# Patient Record
Sex: MALE | URBAN - METROPOLITAN AREA
[De-identification: names, ages, dates, MRNs, and addresses within clinical notes are randomized per-mention and may not be internally consistent; named-entity substitution may affect disease eponyms.]

---

## 2018-12-13 ENCOUNTER — OFFICE VISIT (OUTPATIENT)
Dept: URBAN - METROPOLITAN AREA CLINIC 11 | Facility: CLINIC | Age: 63
End: 2018-12-13
Payer: COMMERCIAL

## 2018-12-13 DIAGNOSIS — H11.152 PINGUECULA, LEFT EYE: ICD-10-CM

## 2018-12-13 DIAGNOSIS — H11.011 AMYLOID PTERYGIUM OF RIGHT EYE: Primary | ICD-10-CM

## 2018-12-13 PROCEDURE — 99204 OFFICE O/P NEW MOD 45 MIN: CPT | Performed by: OPTOMETRIST

## 2018-12-13 ASSESSMENT — INTRAOCULAR PRESSURE
OS: 15
OD: 15

## 2018-12-13 NOTE — IMPRESSION/PLAN
Impression: Amyloid pterygium of right eye: H11.011. Plan: Recommend sx. Refer to Dr Francine Arita for sx eval. Recommend art tears 1gt 2-3x/day ou.

## 2019-01-03 ENCOUNTER — OFFICE VISIT (OUTPATIENT)
Dept: URBAN - METROPOLITAN AREA CLINIC 11 | Facility: CLINIC | Age: 64
End: 2019-01-03
Payer: COMMERCIAL

## 2019-01-03 DIAGNOSIS — H11.051 PERIPHERAL PTERYGIUM, PROGRESSIVE, RIGHT EYE: Primary | Chronic | ICD-10-CM

## 2019-01-03 PROCEDURE — 92012 INTRM OPH EXAM EST PATIENT: CPT | Performed by: OPHTHALMOLOGY

## 2019-01-03 NOTE — IMPRESSION/PLAN
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 OS. Condition: established, stable. Plan: Discussed diagnosis in detail with patient. Discussed treatment options with patient. No treatment is required at this time. Will continue to observe condition and or symptoms.

## 2019-01-03 NOTE — IMPRESSION/PLAN
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 OD. Condition: established, stable. Symptoms: may improve with surgery. Vision: vision threatening. Plan: Discussed diagnosis with patient. Discussed treatment options. Discussed risks, benefits, alternatives to surgery. Patient elects surgical treatment. Recommend RIGHT Pterygium Excision with Graft and MMC.  RL2.

## 2019-05-23 ENCOUNTER — OFFICE VISIT (OUTPATIENT)
Dept: URBAN - METROPOLITAN AREA CLINIC 11 | Facility: CLINIC | Age: 64
End: 2019-05-23
Payer: COMMERCIAL

## 2019-05-23 PROCEDURE — 99213 OFFICE O/P EST LOW 20 MIN: CPT | Performed by: OPTOMETRIST

## 2019-05-23 ASSESSMENT — INTRAOCULAR PRESSURE
OD: 18
OS: 18

## 2019-05-23 NOTE — IMPRESSION/PLAN
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 OD. Condition: established, stable. Symptoms: may improve with surgery. Vision: vision threatening. Plan: recommend sx with Dr Kyle Harrell. Pt wants to wait because insurance only covers 25%.  Recommend art tears tid ou. f/u 4mns

## 2019-06-20 ENCOUNTER — OFFICE VISIT (OUTPATIENT)
Dept: URBAN - METROPOLITAN AREA CLINIC 11 | Facility: CLINIC | Age: 64
End: 2019-06-20
Payer: COMMERCIAL

## 2019-06-20 PROCEDURE — 92012 INTRM OPH EXAM EST PATIENT: CPT | Performed by: OPHTHALMOLOGY

## 2019-06-20 NOTE — IMPRESSION/PLAN
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 OD. Condition: established, stable Symptoms: may improve with surgery. Vision: vision threatening. Plan: Discussed diagnosis with patient. Discussed treatment options. Discussed risks, benefits, alternatives to surgery. Patient elects surgical treatment. Recommend Pterygium Excision with Graft and MMC.  RL2.